# Patient Record
Sex: MALE | URBAN - NONMETROPOLITAN AREA
[De-identification: names, ages, dates, MRNs, and addresses within clinical notes are randomized per-mention and may not be internally consistent; named-entity substitution may affect disease eponyms.]

---

## 2019-12-11 ENCOUNTER — IMPORTED ENCOUNTER (OUTPATIENT)
Dept: URBAN - NONMETROPOLITAN AREA CLINIC 1 | Facility: CLINIC | Age: 79
End: 2019-12-11

## 2019-12-11 PROCEDURE — 92014 COMPRE OPH EXAM EST PT 1/>: CPT

## 2019-12-11 NOTE — PATIENT DISCUSSION
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 -Reviewed symptoms of advancing cataract growth such as glare and halos and decreased vision.-Continue to monitor for now. Pt will notify us if any new symptoms develop.; 's Notes: Lives in [facility]. Has 1yo black lab (Noble Mcdowell). Enjoys bird watching. Former [employer] from [employer]. Had L knee replaced. Had prostate sx. Last name pronounced ""[place]"". "

## 2020-12-17 ENCOUNTER — IMPORTED ENCOUNTER (OUTPATIENT)
Dept: URBAN - NONMETROPOLITAN AREA CLINIC 1 | Facility: CLINIC | Age: 80
End: 2020-12-17

## 2020-12-17 PROBLEM — H02.834: Noted: 2020-12-17

## 2020-12-17 PROBLEM — H02.831: Noted: 2020-12-17

## 2020-12-17 PROBLEM — H52.4: Noted: 2020-12-17

## 2020-12-17 PROBLEM — H52.223: Noted: 2020-12-17

## 2020-12-17 PROBLEM — H52.03: Noted: 2020-12-17

## 2020-12-17 PROBLEM — H43.393: Noted: 2020-12-17

## 2020-12-17 PROCEDURE — 92014 COMPRE OPH EXAM EST PT 1/>: CPT

## 2020-12-17 NOTE — PATIENT DISCUSSION
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 -Reviewed symptoms of advancing cataract growth such as glare and halos and decreased vision.-Continue to monitor for now. Pt will notify us if any new symptoms develop. PVD-Retina flat 360 with no breaks tears or heme.-S&S of RD/RT reviewed with pt.-Stressed that pt should contact our office right away with any changes or increase in symptoms.; Dr's Notes: Lives in [facility]. Has 3yo black lab (Jason Figueroa). Enjoys bird watching. Former [employer] from [employer]. Had L knee replaced. Had prostate sx. Last name pronounced ""[place]"". "

## 2021-12-02 ENCOUNTER — IMPORTED ENCOUNTER (OUTPATIENT)
Dept: URBAN - NONMETROPOLITAN AREA CLINIC 1 | Facility: CLINIC | Age: 81
End: 2021-12-02

## 2021-12-02 PROCEDURE — 92014 COMPRE OPH EXAM EST PT 1/>: CPT

## 2021-12-02 NOTE — PATIENT DISCUSSION
"Cataract(s)-Visually significant.-Cataract(s) causing symptomatic impairment of visual function not correctable with a tolerable change in glasses or contact lenses lighting or non-operative means resulting in specific activity limitations and/or participation restrictions including but not limited to reading viewing television driving or meeting vocational or recreational needs. -Expectation is clearer vision and reduced glare disability after cataract removal.-Refer to Dr Alisha Wahl for cataract evaluation; Dr's Notes: Lives in [facility]. Has 3yo black lab (Sabrina Herrera). Enjoys bird watching. Former [employer] from [employer]. Had L knee replaced. Had prostate sx. Last name pronounced ""[place]"". "

## 2022-04-09 ASSESSMENT — VISUAL ACUITY
OD_SC: 20/40+2
OS_CC: 20/25-1
OS_SC: 20/40-2
OS_SC: 20/40
OU_SC: 20/40-2
OD_SC: 20/40-2
OS_SC: 20/30-1
OD_SC: 20/40
OD_CC: 20/25-1

## 2022-04-09 ASSESSMENT — TONOMETRY
OS_IOP_MMHG: 15
OS_IOP_MMHG: 14
OD_IOP_MMHG: 14
OD_IOP_MMHG: 15
OD_IOP_MMHG: 14
OS_IOP_MMHG: 15